# Patient Record
Sex: FEMALE | Race: WHITE | NOT HISPANIC OR LATINO | Employment: FULL TIME | URBAN - METROPOLITAN AREA
[De-identification: names, ages, dates, MRNs, and addresses within clinical notes are randomized per-mention and may not be internally consistent; named-entity substitution may affect disease eponyms.]

---

## 2017-04-20 ENCOUNTER — HOSPITAL ENCOUNTER (OUTPATIENT)
Dept: RADIOLOGY | Facility: MEDICAL CENTER | Age: 52
End: 2017-04-20
Attending: FAMILY MEDICINE
Payer: COMMERCIAL

## 2017-04-20 DIAGNOSIS — R92.8 ABNORMAL MAMMOGRAM: ICD-10-CM

## 2017-04-20 PROCEDURE — G0204 DX MAMMO INCL CAD BI: HCPCS

## 2017-05-22 ENCOUNTER — HOSPITAL ENCOUNTER (OUTPATIENT)
Dept: LAB | Facility: MEDICAL CENTER | Age: 52
End: 2017-05-22
Attending: FAMILY MEDICINE
Payer: COMMERCIAL

## 2017-05-22 DIAGNOSIS — Z00.00 WELL ADULT EXAM: ICD-10-CM

## 2017-05-22 LAB
25(OH)D3 SERPL-MCNC: 21 NG/ML (ref 30–100)
ALBUMIN SERPL BCP-MCNC: 4.5 G/DL (ref 3.2–4.9)
ALBUMIN/GLOB SERPL: 1.7 G/DL
ALP SERPL-CCNC: 66 U/L (ref 30–99)
ALT SERPL-CCNC: 17 U/L (ref 2–50)
ANION GAP SERPL CALC-SCNC: 7 MMOL/L (ref 0–11.9)
AST SERPL-CCNC: 21 U/L (ref 12–45)
BASOPHILS # BLD AUTO: 0.5 % (ref 0–1.8)
BASOPHILS # BLD: 0.03 K/UL (ref 0–0.12)
BILIRUB SERPL-MCNC: 0.6 MG/DL (ref 0.1–1.5)
BUN SERPL-MCNC: 16 MG/DL (ref 8–22)
CALCIUM SERPL-MCNC: 9.7 MG/DL (ref 8.5–10.5)
CHLORIDE SERPL-SCNC: 103 MMOL/L (ref 96–112)
CHOLEST SERPL-MCNC: 197 MG/DL (ref 100–199)
CO2 SERPL-SCNC: 28 MMOL/L (ref 20–33)
CREAT SERPL-MCNC: 0.88 MG/DL (ref 0.5–1.4)
EOSINOPHIL # BLD AUTO: 0.1 K/UL (ref 0–0.51)
EOSINOPHIL NFR BLD: 1.8 % (ref 0–6.9)
ERYTHROCYTE [DISTWIDTH] IN BLOOD BY AUTOMATED COUNT: 38 FL (ref 35.9–50)
GFR SERPL CREATININE-BSD FRML MDRD: >60 ML/MIN/1.73 M 2
GLOBULIN SER CALC-MCNC: 2.7 G/DL (ref 1.9–3.5)
GLUCOSE SERPL-MCNC: 84 MG/DL (ref 65–99)
HCT VFR BLD AUTO: 41.2 % (ref 37–47)
HDLC SERPL-MCNC: 59 MG/DL
HGB BLD-MCNC: 13.8 G/DL (ref 12–16)
IMM GRANULOCYTES # BLD AUTO: 0.02 K/UL (ref 0–0.11)
IMM GRANULOCYTES NFR BLD AUTO: 0.4 % (ref 0–0.9)
LDLC SERPL CALC-MCNC: 111 MG/DL
LYMPHOCYTES # BLD AUTO: 1.59 K/UL (ref 1–4.8)
LYMPHOCYTES NFR BLD: 27.9 % (ref 22–41)
MCH RBC QN AUTO: 29.2 PG (ref 27–33)
MCHC RBC AUTO-ENTMCNC: 33.5 G/DL (ref 33.6–35)
MCV RBC AUTO: 87.3 FL (ref 81.4–97.8)
MONOCYTES # BLD AUTO: 0.54 K/UL (ref 0–0.85)
MONOCYTES NFR BLD AUTO: 9.5 % (ref 0–13.4)
NEUTROPHILS # BLD AUTO: 3.41 K/UL (ref 2–7.15)
NEUTROPHILS NFR BLD: 59.9 % (ref 44–72)
NRBC # BLD AUTO: 0 K/UL
NRBC BLD AUTO-RTO: 0 /100 WBC
PLATELET # BLD AUTO: 257 K/UL (ref 164–446)
PMV BLD AUTO: 10.3 FL (ref 9–12.9)
POTASSIUM SERPL-SCNC: 3.8 MMOL/L (ref 3.6–5.5)
PROT SERPL-MCNC: 7.2 G/DL (ref 6–8.2)
RBC # BLD AUTO: 4.72 M/UL (ref 4.2–5.4)
SODIUM SERPL-SCNC: 138 MMOL/L (ref 135–145)
T4 FREE SERPL-MCNC: 0.88 NG/DL (ref 0.53–1.43)
TRIGL SERPL-MCNC: 134 MG/DL (ref 0–149)
TSH SERPL DL<=0.005 MIU/L-ACNC: 0.79 UIU/ML (ref 0.3–3.7)
WBC # BLD AUTO: 5.7 K/UL (ref 4.8–10.8)

## 2017-05-22 PROCEDURE — 84443 ASSAY THYROID STIM HORMONE: CPT

## 2017-05-22 PROCEDURE — 82306 VITAMIN D 25 HYDROXY: CPT

## 2017-05-22 PROCEDURE — 80053 COMPREHEN METABOLIC PANEL: CPT

## 2017-05-22 PROCEDURE — 85025 COMPLETE CBC W/AUTO DIFF WBC: CPT

## 2017-05-22 PROCEDURE — 84439 ASSAY OF FREE THYROXINE: CPT

## 2017-05-22 PROCEDURE — 80061 LIPID PANEL: CPT

## 2017-05-22 PROCEDURE — 36415 COLL VENOUS BLD VENIPUNCTURE: CPT

## 2018-03-01 ENCOUNTER — APPOINTMENT (RX ONLY)
Dept: URBAN - METROPOLITAN AREA CLINIC 31 | Facility: CLINIC | Age: 53
Setting detail: DERMATOLOGY
End: 2018-03-01

## 2018-03-01 DIAGNOSIS — L57.8 OTHER SKIN CHANGES DUE TO CHRONIC EXPOSURE TO NONIONIZING RADIATION: ICD-10-CM

## 2018-03-01 DIAGNOSIS — L57.0 ACTINIC KERATOSIS: ICD-10-CM

## 2018-03-01 DIAGNOSIS — D22 MELANOCYTIC NEVI: ICD-10-CM

## 2018-03-01 DIAGNOSIS — L82.1 OTHER SEBORRHEIC KERATOSIS: ICD-10-CM

## 2018-03-01 DIAGNOSIS — D18.0 HEMANGIOMA: ICD-10-CM

## 2018-03-01 PROBLEM — D22.5 MELANOCYTIC NEVI OF TRUNK: Status: ACTIVE | Noted: 2018-03-01

## 2018-03-01 PROBLEM — D22.61 MELANOCYTIC NEVI OF RIGHT UPPER LIMB, INCLUDING SHOULDER: Status: ACTIVE | Noted: 2018-03-01

## 2018-03-01 PROBLEM — D48.5 NEOPLASM OF UNCERTAIN BEHAVIOR OF SKIN: Status: ACTIVE | Noted: 2018-03-01

## 2018-03-01 PROBLEM — D22.39 MELANOCYTIC NEVI OF OTHER PARTS OF FACE: Status: ACTIVE | Noted: 2018-03-01

## 2018-03-01 PROBLEM — D22.62 MELANOCYTIC NEVI OF LEFT UPPER LIMB, INCLUDING SHOULDER: Status: ACTIVE | Noted: 2018-03-01

## 2018-03-01 PROBLEM — D18.01 HEMANGIOMA OF SKIN AND SUBCUTANEOUS TISSUE: Status: ACTIVE | Noted: 2018-03-01

## 2018-03-01 PROCEDURE — ? COUNSELING

## 2018-03-01 PROCEDURE — 99203 OFFICE O/P NEW LOW 30 MIN: CPT | Mod: 25

## 2018-03-01 PROCEDURE — ? LIQUID NITROGEN

## 2018-03-01 PROCEDURE — 17000 DESTRUCT PREMALG LESION: CPT

## 2018-03-01 PROCEDURE — 11100: CPT | Mod: 59

## 2018-03-01 PROCEDURE — ? BIOPSY BY SHAVE METHOD

## 2018-03-01 ASSESSMENT — LOCATION SIMPLE DESCRIPTION DERM
LOCATION SIMPLE: LEFT HAND
LOCATION SIMPLE: LEFT UPPER BACK
LOCATION SIMPLE: RIGHT UPPER BACK
LOCATION SIMPLE: LEFT LOWER BACK
LOCATION SIMPLE: LEFT CHEEK
LOCATION SIMPLE: CHEST
LOCATION SIMPLE: RIGHT FOREARM
LOCATION SIMPLE: RIGHT UPPER ARM
LOCATION SIMPLE: NOSE
LOCATION SIMPLE: LEFT ANTERIOR NECK
LOCATION SIMPLE: LEFT FOREARM
LOCATION SIMPLE: RIGHT HAND

## 2018-03-01 ASSESSMENT — LOCATION DETAILED DESCRIPTION DERM
LOCATION DETAILED: LEFT CENTRAL MALAR CHEEK
LOCATION DETAILED: RIGHT ANTERIOR DISTAL UPPER ARM
LOCATION DETAILED: LEFT SUPERIOR ANTERIOR NECK
LOCATION DETAILED: RIGHT MEDIAL SUPERIOR CHEST
LOCATION DETAILED: LEFT ULNAR DORSAL HAND
LOCATION DETAILED: RIGHT MEDIAL INFERIOR CHEST
LOCATION DETAILED: LEFT MEDIAL INFERIOR CHEST
LOCATION DETAILED: RIGHT VENTRAL PROXIMAL FOREARM
LOCATION DETAILED: LEFT INFERIOR LATERAL MIDBACK
LOCATION DETAILED: RIGHT DORSAL MIDDLE METACARPOPHALANGEAL JOINT
LOCATION DETAILED: LEFT SUPERIOR UPPER BACK
LOCATION DETAILED: LEFT VENTRAL PROXIMAL FOREARM
LOCATION DETAILED: NASAL TIP
LOCATION DETAILED: RIGHT INFERIOR MEDIAL UPPER BACK
LOCATION DETAILED: RIGHT MEDIAL UPPER BACK

## 2018-03-01 ASSESSMENT — LOCATION ZONE DERM
LOCATION ZONE: ARM
LOCATION ZONE: NECK
LOCATION ZONE: TRUNK
LOCATION ZONE: FACE
LOCATION ZONE: NOSE
LOCATION ZONE: HAND

## 2018-03-01 NOTE — PROCEDURE: LIQUID NITROGEN
Detail Level: Detailed
Consent: The patient's consent was obtained including but not limited to risks of crusting, scabbing, blistering, scarring, darker or lighter pigmentary change, recurrence, incomplete removal and infection.
Post-Care Instructions: I reviewed with the patient in detail post-care instructions. Patient is to wear sunprotection, and avoid picking at any of the treated lesions. Pt may apply Vaseline to crusted or scabbing areas.
Duration Of Freeze Thaw-Cycle (Seconds): 15
Number Of Freeze-Thaw Cycles: 1 freeze-thaw cycle
Render Post-Care Instructions In Note?: no

## 2018-03-01 NOTE — PROCEDURE: BIOPSY BY SHAVE METHOD
Render Post-Care Instructions In Note?: no
Notification Instructions: Patient will be notified of biopsy results. However, patient instructed to call the office if not contacted within 2 weeks.
Anesthesia Volume In Cc: 0
Electrodesiccation Text: The wound bed was treated with electrodesiccation after the biopsy was performed.
Type Of Destruction Used: Curettage
Size Of Lesion In Cm: 0.7
Anesthesia Type: 1% lidocaine with epinephrine
Lab Facility: 
Post-Care Instructions: I reviewed with the patient in detail post-care instructions. Patient is to keep the biopsy site bandaged overnight, and then wash once daily with soap and water and then apply a thin layer of petrolatum once daily until healed.
Detail Level: Detailed
Consent: Written consent was obtained and risks were reviewed including but not limited to scarring, infection, bleeding, scabbing, incomplete removal, nerve damage and allergy to anesthesia.
Cryotherapy Text: The wound bed was treated with cryotherapy after the biopsy was performed.
Hemostasis: Aluminum Chloride and Electrocautery
Dressing: bandage
Electrodesiccation And Curettage Text: The wound bed was treated with electrodesiccation and curettage after the biopsy was performed.
Lab: [id number]
Biopsy Type: H and E
Silver Nitrate Text: The wound bed was treated with silver nitrate after the biopsy was performed.
Wound Care: Petrolatum
Billing Type: Third-Party Bill
Curettage Text: The wound bed was treated with curettage after the biopsy was performed.

## 2018-05-09 PROBLEM — R00.2 HEART PALPITATIONS: Status: ACTIVE | Noted: 2018-05-09

## 2018-05-09 PROBLEM — K21.9 GASTROESOPHAGEAL REFLUX DISEASE: Status: ACTIVE | Noted: 2018-05-09

## 2018-06-23 ENCOUNTER — HOSPITAL ENCOUNTER (OUTPATIENT)
Dept: RADIOLOGY | Facility: MEDICAL CENTER | Age: 53
End: 2018-06-23
Attending: FAMILY MEDICINE
Payer: COMMERCIAL

## 2018-06-23 DIAGNOSIS — Z00.00 WELL ADULT EXAM: ICD-10-CM

## 2018-06-23 PROCEDURE — 77067 SCR MAMMO BI INCL CAD: CPT

## 2018-07-13 ENCOUNTER — HOSPITAL ENCOUNTER (OUTPATIENT)
Dept: HOSPITAL 8 - LAB | Age: 53
Discharge: HOME | End: 2018-07-13
Attending: FAMILY MEDICINE
Payer: COMMERCIAL

## 2018-07-13 DIAGNOSIS — Z00.00: Primary | ICD-10-CM

## 2018-07-13 LAB
ALBUMIN SERPL-MCNC: 3.8 G/DL (ref 3.4–5)
ALP SERPL-CCNC: 77 U/L (ref 45–117)
ALT SERPL-CCNC: 70 U/L (ref 12–78)
ANION GAP SERPL CALC-SCNC: 8 MMOL/L (ref 5–15)
BASOPHILS # BLD AUTO: 0.03 X10^3/UL (ref 0–0.1)
BASOPHILS NFR BLD AUTO: 1 % (ref 0–1)
BILIRUB SERPL-MCNC: 0.5 MG/DL (ref 0.2–1)
CALCIUM SERPL-MCNC: 8.8 MG/DL (ref 8.5–10.1)
CHLORIDE SERPL-SCNC: 108 MMOL/L (ref 98–107)
CHOL/HDL RATIO: 4.2
CREAT SERPL-MCNC: 1.17 MG/DL (ref 0.55–1.02)
EOSINOPHIL # BLD AUTO: 0.14 X10^3/UL (ref 0–0.4)
EOSINOPHIL NFR BLD AUTO: 3 % (ref 1–7)
ERYTHROCYTE [DISTWIDTH] IN BLOOD BY AUTOMATED COUNT: 12.6 % (ref 9.6–15.2)
HDL CHOL %: 24 % (ref 28–40)
HDL CHOLESTEROL (DIRECT): 49 MG/DL (ref 40–60)
LDL CHOLESTEROL,CALCULATED: 131 MG/DL (ref 54–169)
LDLC/HDLC SERPL: 2.7 {RATIO} (ref 0.5–3)
LYMPHOCYTES # BLD AUTO: 1.73 X10^3/UL (ref 1–3.4)
LYMPHOCYTES NFR BLD AUTO: 34 % (ref 22–44)
MCH RBC QN AUTO: 30 PG (ref 27–34.8)
MCHC RBC AUTO-ENTMCNC: 34.3 G/DL (ref 32.4–35.8)
MCV RBC AUTO: 87.2 FL (ref 80–100)
MD: NO
MONOCYTES # BLD AUTO: 0.52 X10^3/UL (ref 0.2–0.8)
MONOCYTES NFR BLD AUTO: 10 % (ref 2–9)
NEUTROPHILS # BLD AUTO: 2.7 X10^3/UL (ref 1.8–6.8)
NEUTROPHILS NFR BLD AUTO: 53 % (ref 42–75)
PLATELET # BLD AUTO: 257 X10^3/UL (ref 130–400)
PMV BLD AUTO: 8.5 FL (ref 7.4–10.4)
PROT SERPL-MCNC: 7.3 G/DL (ref 6.4–8.2)
RBC # BLD AUTO: 4.79 X10^6/UL (ref 3.82–5.3)
T4 FREE SERPL-MCNC: 0.98 NG/DL (ref 0.76–1.46)
TRIGL SERPL-MCNC: 125 MG/DL (ref 50–200)
TSH SERPL-ACNC: 0.68 MIU/L (ref 0.36–3.74)
VLDLC SERPL CALC-MCNC: 25 MG/DL (ref 0–25)

## 2018-07-13 PROCEDURE — 84481 FREE ASSAY (FT-3): CPT

## 2018-07-13 PROCEDURE — 80061 LIPID PANEL: CPT

## 2018-07-13 PROCEDURE — 36415 COLL VENOUS BLD VENIPUNCTURE: CPT

## 2018-07-13 PROCEDURE — 84439 ASSAY OF FREE THYROXINE: CPT

## 2018-07-13 PROCEDURE — 86677 HELICOBACTER PYLORI ANTIBODY: CPT

## 2018-07-13 PROCEDURE — 80053 COMPREHEN METABOLIC PANEL: CPT

## 2018-07-13 PROCEDURE — 84443 ASSAY THYROID STIM HORMONE: CPT

## 2018-07-13 PROCEDURE — 85025 COMPLETE CBC W/AUTO DIFF WBC: CPT

## 2019-04-02 ENCOUNTER — APPOINTMENT (RX ONLY)
Dept: URBAN - METROPOLITAN AREA CLINIC 31 | Facility: CLINIC | Age: 54
Setting detail: DERMATOLOGY
End: 2019-04-02

## 2019-04-02 DIAGNOSIS — D18.0 HEMANGIOMA: ICD-10-CM

## 2019-04-02 DIAGNOSIS — D22 MELANOCYTIC NEVI: ICD-10-CM

## 2019-04-02 DIAGNOSIS — L57.0 ACTINIC KERATOSIS: ICD-10-CM

## 2019-04-02 DIAGNOSIS — L57.8 OTHER SKIN CHANGES DUE TO CHRONIC EXPOSURE TO NONIONIZING RADIATION: ICD-10-CM

## 2019-04-02 DIAGNOSIS — L82.1 OTHER SEBORRHEIC KERATOSIS: ICD-10-CM

## 2019-04-02 PROBLEM — D22.5 MELANOCYTIC NEVI OF TRUNK: Status: ACTIVE | Noted: 2019-04-02

## 2019-04-02 PROBLEM — D18.01 HEMANGIOMA OF SKIN AND SUBCUTANEOUS TISSUE: Status: ACTIVE | Noted: 2019-04-02

## 2019-04-02 PROBLEM — D22.4 MELANOCYTIC NEVI OF SCALP AND NECK: Status: ACTIVE | Noted: 2019-04-02

## 2019-04-02 PROCEDURE — ? COUNSELING

## 2019-04-02 PROCEDURE — ? LIQUID NITROGEN

## 2019-04-02 PROCEDURE — 99214 OFFICE O/P EST MOD 30 MIN: CPT | Mod: 25

## 2019-04-02 PROCEDURE — 17000 DESTRUCT PREMALG LESION: CPT

## 2019-04-02 ASSESSMENT — LOCATION DETAILED DESCRIPTION DERM
LOCATION DETAILED: SUBXIPHOID
LOCATION DETAILED: RIGHT VENTRAL PROXIMAL FOREARM
LOCATION DETAILED: RIGHT LATERAL SUPERIOR CHEST
LOCATION DETAILED: LEFT MID-UPPER BACK
LOCATION DETAILED: LEFT RIB CAGE
LOCATION DETAILED: LEFT ANTERIOR SHOULDER
LOCATION DETAILED: LEFT INFERIOR UPPER BACK
LOCATION DETAILED: LEFT CENTRAL MALAR CHEEK
LOCATION DETAILED: MID POSTERIOR NECK
LOCATION DETAILED: LEFT SUPERIOR MEDIAL MIDBACK
LOCATION DETAILED: LEFT VENTRAL PROXIMAL FOREARM
LOCATION DETAILED: LEFT ULNAR DORSAL HAND
LOCATION DETAILED: RIGHT SUPERIOR MEDIAL UPPER BACK
LOCATION DETAILED: RIGHT POSTERIOR SHOULDER
LOCATION DETAILED: LEFT PROXIMAL DORSAL FOREARM
LOCATION DETAILED: RIGHT MEDIAL INFERIOR CHEST
LOCATION DETAILED: LEFT INFERIOR MEDIAL UPPER BACK
LOCATION DETAILED: RIGHT ANTERIOR DISTAL UPPER ARM
LOCATION DETAILED: RIGHT PROXIMAL DORSAL FOREARM
LOCATION DETAILED: LEFT MEDIAL BREAST 7-8:00 REGION
LOCATION DETAILED: PERIUMBILICAL SKIN
LOCATION DETAILED: RIGHT INFERIOR MEDIAL UPPER BACK
LOCATION DETAILED: SUPERIOR THORACIC SPINE
LOCATION DETAILED: RIGHT RIB CAGE
LOCATION DETAILED: LEFT SUPERIOR CENTRAL MALAR CHEEK
LOCATION DETAILED: LEFT MEDIAL BREAST 9-10:00 REGION
LOCATION DETAILED: LEFT SUPERIOR ANTERIOR NECK
LOCATION DETAILED: RIGHT DORSAL MIDDLE METACARPOPHALANGEAL JOINT
LOCATION DETAILED: RIGHT ANTERIOR SHOULDER
LOCATION DETAILED: LEFT SUPERIOR LATERAL UPPER BACK

## 2019-04-02 ASSESSMENT — LOCATION SIMPLE DESCRIPTION DERM
LOCATION SIMPLE: LEFT HAND
LOCATION SIMPLE: RIGHT HAND
LOCATION SIMPLE: LEFT ANTERIOR NECK
LOCATION SIMPLE: LEFT FOREARM
LOCATION SIMPLE: RIGHT FOREARM
LOCATION SIMPLE: LEFT SHOULDER
LOCATION SIMPLE: POSTERIOR NECK
LOCATION SIMPLE: LEFT CHEEK
LOCATION SIMPLE: RIGHT UPPER BACK
LOCATION SIMPLE: ABDOMEN
LOCATION SIMPLE: LEFT UPPER BACK
LOCATION SIMPLE: UPPER BACK
LOCATION SIMPLE: RIGHT SHOULDER
LOCATION SIMPLE: RIGHT UPPER ARM
LOCATION SIMPLE: CHEST
LOCATION SIMPLE: LEFT BREAST
LOCATION SIMPLE: LEFT LOWER BACK

## 2019-04-02 ASSESSMENT — LOCATION ZONE DERM
LOCATION ZONE: FACE
LOCATION ZONE: TRUNK
LOCATION ZONE: ARM
LOCATION ZONE: NECK
LOCATION ZONE: HAND

## 2019-09-05 ENCOUNTER — HOSPITAL ENCOUNTER (OUTPATIENT)
Dept: LAB | Facility: MEDICAL CENTER | Age: 54
End: 2019-09-05
Attending: FAMILY MEDICINE
Payer: COMMERCIAL

## 2019-09-05 LAB
ALBUMIN SERPL BCP-MCNC: 4.2 G/DL (ref 3.2–4.9)
ALBUMIN/GLOB SERPL: 1.5 G/DL
ALP SERPL-CCNC: 56 U/L (ref 30–99)
ALT SERPL-CCNC: 19 U/L (ref 2–50)
ANION GAP SERPL CALC-SCNC: 9 MMOL/L (ref 0–11.9)
AST SERPL-CCNC: 23 U/L (ref 12–45)
BASOPHILS # BLD AUTO: 1.3 % (ref 0–1.8)
BASOPHILS # BLD: 0.06 K/UL (ref 0–0.12)
BILIRUB SERPL-MCNC: 0.6 MG/DL (ref 0.1–1.5)
BUN SERPL-MCNC: 24 MG/DL (ref 8–22)
CALCIUM SERPL-MCNC: 9.2 MG/DL (ref 8.5–10.5)
CHLORIDE SERPL-SCNC: 104 MMOL/L (ref 96–112)
CO2 SERPL-SCNC: 27 MMOL/L (ref 20–33)
CREAT SERPL-MCNC: 1.14 MG/DL (ref 0.5–1.4)
EOSINOPHIL # BLD AUTO: 0.12 K/UL (ref 0–0.51)
EOSINOPHIL NFR BLD: 2.6 % (ref 0–6.9)
ERYTHROCYTE [DISTWIDTH] IN BLOOD BY AUTOMATED COUNT: 39.8 FL (ref 35.9–50)
GLOBULIN SER CALC-MCNC: 2.8 G/DL (ref 1.9–3.5)
GLUCOSE SERPL-MCNC: 92 MG/DL (ref 65–99)
HCT VFR BLD AUTO: 42.5 % (ref 37–47)
HGB BLD-MCNC: 13.5 G/DL (ref 12–16)
IMM GRANULOCYTES # BLD AUTO: 0.01 K/UL (ref 0–0.11)
IMM GRANULOCYTES NFR BLD AUTO: 0.2 % (ref 0–0.9)
LYMPHOCYTES # BLD AUTO: 1.74 K/UL (ref 1–4.8)
LYMPHOCYTES NFR BLD: 37.9 % (ref 22–41)
MCH RBC QN AUTO: 28.8 PG (ref 27–33)
MCHC RBC AUTO-ENTMCNC: 31.8 G/DL (ref 33.6–35)
MCV RBC AUTO: 90.6 FL (ref 81.4–97.8)
MONOCYTES # BLD AUTO: 0.45 K/UL (ref 0–0.85)
MONOCYTES NFR BLD AUTO: 9.8 % (ref 0–13.4)
NEUTROPHILS # BLD AUTO: 2.21 K/UL (ref 2–7.15)
NEUTROPHILS NFR BLD: 48.2 % (ref 44–72)
NRBC # BLD AUTO: 0 K/UL
NRBC BLD-RTO: 0 /100 WBC
PLATELET # BLD AUTO: 228 K/UL (ref 164–446)
PMV BLD AUTO: 10.3 FL (ref 9–12.9)
POTASSIUM SERPL-SCNC: 3.9 MMOL/L (ref 3.6–5.5)
PROT SERPL-MCNC: 7 G/DL (ref 6–8.2)
RBC # BLD AUTO: 4.69 M/UL (ref 4.2–5.4)
SODIUM SERPL-SCNC: 140 MMOL/L (ref 135–145)
T4 FREE SERPL-MCNC: 0.7 NG/DL (ref 0.53–1.43)
TSH SERPL DL<=0.005 MIU/L-ACNC: 1.51 UIU/ML (ref 0.38–5.33)
WBC # BLD AUTO: 4.6 K/UL (ref 4.8–10.8)

## 2019-09-05 PROCEDURE — 84443 ASSAY THYROID STIM HORMONE: CPT

## 2019-09-05 PROCEDURE — 85025 COMPLETE CBC W/AUTO DIFF WBC: CPT

## 2019-09-05 PROCEDURE — 84439 ASSAY OF FREE THYROXINE: CPT

## 2019-09-05 PROCEDURE — 80053 COMPREHEN METABOLIC PANEL: CPT

## 2019-09-05 PROCEDURE — 36415 COLL VENOUS BLD VENIPUNCTURE: CPT

## 2019-09-10 ENCOUNTER — HOSPITAL ENCOUNTER (OUTPATIENT)
Dept: LAB | Facility: MEDICAL CENTER | Age: 54
End: 2019-09-10
Payer: COMMERCIAL

## 2019-09-10 LAB
CHOLEST SERPL-MCNC: 192 MG/DL (ref 100–199)
FASTING STATUS PATIENT QL REPORTED: NORMAL
GLUCOSE SERPL-MCNC: 91 MG/DL (ref 65–99)
HDLC SERPL-MCNC: 60 MG/DL
LDLC SERPL CALC-MCNC: 119 MG/DL
TRIGL SERPL-MCNC: 66 MG/DL (ref 0–149)

## 2019-10-15 ENCOUNTER — APPOINTMENT (OUTPATIENT)
Dept: RADIOLOGY | Facility: MEDICAL CENTER | Age: 54
End: 2019-10-15
Attending: OBSTETRICS & GYNECOLOGY
Payer: COMMERCIAL

## 2019-10-31 ENCOUNTER — OFFICE VISIT (OUTPATIENT)
Dept: URGENT CARE | Facility: CLINIC | Age: 54
End: 2019-10-31
Payer: COMMERCIAL

## 2019-10-31 VITALS
SYSTOLIC BLOOD PRESSURE: 118 MMHG | BODY MASS INDEX: 26.46 KG/M2 | RESPIRATION RATE: 14 BRPM | OXYGEN SATURATION: 98 % | HEIGHT: 64 IN | HEART RATE: 85 BPM | DIASTOLIC BLOOD PRESSURE: 78 MMHG | TEMPERATURE: 97.4 F | WEIGHT: 155 LBS

## 2019-10-31 DIAGNOSIS — J11.1 INFLUENZA-LIKE ILLNESS: ICD-10-CM

## 2019-10-31 LAB
FLUAV+FLUBV AG SPEC QL IA: NEGATIVE
INT CON NEG: NORMAL
INT CON POS: NORMAL

## 2019-10-31 PROCEDURE — 87804 INFLUENZA ASSAY W/OPTIC: CPT | Performed by: PHYSICIAN ASSISTANT

## 2019-10-31 PROCEDURE — 99203 OFFICE O/P NEW LOW 30 MIN: CPT | Performed by: PHYSICIAN ASSISTANT

## 2019-10-31 RX ORDER — OSELTAMIVIR PHOSPHATE 75 MG/1
75 CAPSULE ORAL 2 TIMES DAILY
Qty: 10 CAP | Refills: 0 | Status: SHIPPED | OUTPATIENT
Start: 2019-10-31 | End: 2020-04-28

## 2019-10-31 RX ORDER — ALBUTEROL SULFATE 90 UG/1
2 AEROSOL, METERED RESPIRATORY (INHALATION) EVERY 6 HOURS PRN
Qty: 8.5 G | Refills: 0 | Status: SHIPPED | OUTPATIENT
Start: 2019-10-31 | End: 2020-11-12

## 2019-10-31 ASSESSMENT — ENCOUNTER SYMPTOMS
SORE THROAT: 1
EYE PAIN: 0
COUGH: 1
VOMITING: 0
CHILLS: 1
HEADACHES: 1
FEVER: 1
WHEEZING: 0
NAUSEA: 0
SPUTUM PRODUCTION: 1
DIZZINESS: 0
BLURRED VISION: 0
PALPITATIONS: 0
SINUS PAIN: 1
MYALGIAS: 1

## 2019-10-31 NOTE — PROGRESS NOTES
Subjective:      Rik Portillo is a 54 y.o. female who presents with Nasal Congestion; Cough; and Generalized Body Aches      HPI:  This is a new problem. Rik Portillo is a 54 y.o. female who presents today with flulike symptoms.  Patient reports that approximately 4 days ago she started to have a little bit of nasal congestion.  She thought it was just allergy so she started taking allergy medication.  She said she was generally feeling fine until last night.  She reports last night she started to get body aches, chills, and fever.  This morning when she checked her temperature it was 101.4 °F.  She also reports cough intermittently productive of sputum.  She has mild sore throat and headache with sinus pain/pressure.  She took some ibuprofen earlier today which provided mild relief of symptoms.  She denies chest pain, shortness of breath, nausea/vomiting, or lightheadedness/dizziness.      Review of Systems   Constitutional: Positive for chills, fever and malaise/fatigue.   HENT: Positive for congestion, sinus pain and sore throat. Negative for ear pain.    Eyes: Negative for blurred vision and pain.   Respiratory: Positive for cough and sputum production. Negative for wheezing.    Cardiovascular: Negative for chest pain and palpitations.   Gastrointestinal: Negative for nausea and vomiting.   Musculoskeletal: Positive for myalgias.   Skin: Negative for rash.   Neurological: Positive for headaches. Negative for dizziness.       PMH:  has a past medical history of Abnormal mammogram. She also has no past medical history of Arthritis, Asthma, Hypertension, or Kidney disease.  MEDS:   Current Outpatient Medications:   •  estradiol (MINIVELLE) 0.1 MG/24HR PATCH BIWEEKLY, Apply one patch to skin as directed 2 times a week, Disp: 8 Patch, Rfl: 5  •  aspirin 81 MG tablet, Take 81 mg by mouth every day., Disp: , Rfl:   •  omeprazole (PRILOSEC) 20 MG delayed-release capsule, Take 1 Cap by mouth 2 times a day., Disp: 60 Cap,  "Rfl: 5  •  cyclobenzaprine (FLEXERIL) 10 MG Tab, Take 0.5 Tabs by mouth every 8 hours as needed (Headaches). For headaches, Disp: 30 Tab, Rfl: 5  •  ondansetron (ZOFRAN ODT) 4 MG TABLET DISPERSIBLE, Take 1 Tab by mouth as needed. For travel, Disp: 10 Tab, Rfl: 0  ALLERGIES:   Allergies   Allergen Reactions   • Phenergan [Benzyl Alc-Promethazine]      Nerve Reaction   • Reglan [Kdc:Yellow Dye+Ci Pigment Blue 63+Metoclopramide]      Anxiety     SURGHX:   Past Surgical History:   Procedure Laterality Date   • TONSILLECTOMY AND ADENOIDECTOMY  1998   • ABDOMINAL HYSTERECTOMY TOTAL     • APPENDECTOMY     • PRIMARY C SECTION     • TUBAL COAGULATION LAPAROSCOPIC BILATERAL       SOCHX:  reports that she has never smoked. She has never used smokeless tobacco. She reports that she drinks alcohol. She reports that she does not use drugs.  FH: Family history was reviewed, no pertinent findings to report     Objective:     /78 (BP Location: Right arm, Patient Position: Sitting)   Pulse 85   Temp 36.3 °C (97.4 °F)   Resp 14   Ht 1.626 m (5' 4\")   Wt 70.3 kg (155 lb)   SpO2 98%   BMI 26.61 kg/m²      Physical Exam   Constitutional: She is oriented to person, place, and time. She appears well-developed and well-nourished.   HENT:   Head: Normocephalic and atraumatic.   Right Ear: Tympanic membrane, external ear and ear canal normal.   Left Ear: Tympanic membrane, external ear and ear canal normal.   Nose: Mucosal edema and rhinorrhea present. Right sinus exhibits maxillary sinus tenderness and frontal sinus tenderness. Left sinus exhibits maxillary sinus tenderness and frontal sinus tenderness.   Mouth/Throat: Uvula is midline, oropharynx is clear and moist and mucous membranes are normal.   Eyes: Pupils are equal, round, and reactive to light. Conjunctivae are normal.   Neck: Normal range of motion.   Cardiovascular: Normal rate, regular rhythm and normal heart sounds.   No murmur heard.  Pulmonary/Chest: Effort normal " and breath sounds normal. She has no wheezes.   Lymphadenopathy:     She has no cervical adenopathy.   Neurological: She is alert and oriented to person, place, and time.   Skin: Skin is warm and dry. Capillary refill takes less than 2 seconds.   Psychiatric: She has a normal mood and affect. Her behavior is normal. Judgment normal.   Vitals reviewed.      POCT Influenza A/B - Negative     Assessment/Plan:     1. Influenza-like illness  - POCT Influenza A/B  - oseltamivir (TAMIFLU) 75 MG Cap; Take 1 Cap by mouth 2 times a day.  Dispense: 10 Cap; Refill: 0  - albuterol 108 (90 Base) MCG/ACT Aero Soln inhalation aerosol; Inhale 2 Puffs by mouth every 6 hours as needed for Shortness of Breath.  Dispense: 8.5 g; Refill: 0  - PO fluids  - Rest  - Tylenol or ibuprofen as needed for fever > 100.4 F  - Note given for work      Differential Diagnosis, natural history, and supportive care discussed. Return to the Urgent Care or follow up with your PCP if symptoms fail to resolve, or for any new or worsening symptoms. Emergency room precautions discussed. Patient and/or family appears understanding of information.

## 2019-10-31 NOTE — LETTER
October 31, 2019         Patient: Rik Portillo   YOB: 1965   Date of Visit: 10/31/2019           To Whom it May Concern:    Rik Portillo was seen in my clinic on 10/31/2019. Please excuse her absence from work for 10/31/2019 and 11/1/2019.    If you have any questions or concerns, please don't hesitate to call.        Sincerely,           Joselin Hogue P.A.-C.  Electronically Signed

## 2019-11-05 ENCOUNTER — HOSPITAL ENCOUNTER (OUTPATIENT)
Dept: RADIOLOGY | Facility: MEDICAL CENTER | Age: 54
End: 2019-11-05
Attending: OBSTETRICS & GYNECOLOGY
Payer: COMMERCIAL

## 2019-11-05 DIAGNOSIS — Z01.419 ROUTINE GYNECOLOGICAL EXAMINATION: ICD-10-CM

## 2019-11-05 PROCEDURE — 77063 BREAST TOMOSYNTHESIS BI: CPT

## 2020-04-28 ENCOUNTER — APPOINTMENT (OUTPATIENT)
Dept: RADIOLOGY | Facility: IMAGING CENTER | Age: 55
End: 2020-04-28
Attending: NURSE PRACTITIONER
Payer: COMMERCIAL

## 2020-04-28 ENCOUNTER — OFFICE VISIT (OUTPATIENT)
Dept: URGENT CARE | Facility: CLINIC | Age: 55
End: 2020-04-28
Payer: COMMERCIAL

## 2020-04-28 VITALS
HEIGHT: 64 IN | RESPIRATION RATE: 16 BRPM | BODY MASS INDEX: 27.39 KG/M2 | DIASTOLIC BLOOD PRESSURE: 76 MMHG | OXYGEN SATURATION: 95 % | TEMPERATURE: 97.6 F | HEART RATE: 93 BPM | SYSTOLIC BLOOD PRESSURE: 112 MMHG | WEIGHT: 160.4 LBS

## 2020-04-28 DIAGNOSIS — S96.911A RIGHT FOOT STRAIN, INITIAL ENCOUNTER: ICD-10-CM

## 2020-04-28 DIAGNOSIS — M19.071 OSTEOARTHRITIS OF RIGHT FOOT, UNSPECIFIED OSTEOARTHRITIS TYPE: ICD-10-CM

## 2020-04-28 DIAGNOSIS — M20.11 HALLUX VALGUS OF RIGHT FOOT: ICD-10-CM

## 2020-04-28 DIAGNOSIS — M79.671 RIGHT FOOT PAIN: ICD-10-CM

## 2020-04-28 PROCEDURE — 73630 X-RAY EXAM OF FOOT: CPT | Mod: TC,RT | Performed by: NURSE PRACTITIONER

## 2020-04-28 PROCEDURE — 99203 OFFICE O/P NEW LOW 30 MIN: CPT | Performed by: NURSE PRACTITIONER

## 2020-04-28 RX ORDER — IBUPROFEN 600 MG/1
600 TABLET ORAL EVERY 6 HOURS PRN
Qty: 30 TAB | Refills: 0 | Status: SHIPPED | OUTPATIENT
Start: 2020-04-28 | End: 2020-11-12

## 2020-04-28 ASSESSMENT — FIBROSIS 4 INDEX: FIB4 SCORE: 1.25

## 2020-04-28 ASSESSMENT — ENCOUNTER SYMPTOMS
SENSORY CHANGE: 0
JOINT SWELLING: 0
FEVER: 0
WEAKNESS: 0

## 2020-04-28 NOTE — PROGRESS NOTES
"  Subjective:     Rik Portillo is a 55 y.o. female who presents for Foot Pain ((R) foot;  x 4 days; running injury;lateral side )      Was running on Saturday, when she noted she may have done something to hurt her right foot. Was running on sand, \"thought ouch\", and completed her run. Was sore the next day. Now has pain with standing. Starts as an ache to the lateral aspect and becomes more intense. No pain while sitting. No bruising, redness, swelling. Iced and took Tylenol. History of a horse stepping on foot 6 years ago. Can tolerate ibuprofen.     Foot Problem   This is a new problem. The current episode started in the past 7 days. The problem occurs daily. The problem has been gradually worsening. Pertinent negatives include no fever, joint swelling, rash or weakness. The symptoms are aggravated by standing and walking. She has tried ice, rest and acetaminophen for the symptoms. The treatment provided mild relief.       Past Medical History:   Diagnosis Date   • Abnormal mammogram        Past Surgical History:   Procedure Laterality Date   • TONSILLECTOMY AND ADENOIDECTOMY  1998   • ABDOMINAL HYSTERECTOMY TOTAL     • APPENDECTOMY     • PRIMARY C SECTION     • TUBAL COAGULATION LAPAROSCOPIC BILATERAL         Social History     Socioeconomic History   • Marital status:      Spouse name: Not on file   • Number of children: Not on file   • Years of education: Not on file   • Highest education level: Not on file   Occupational History   • Not on file   Social Needs   • Financial resource strain: Not on file   • Food insecurity     Worry: Not on file     Inability: Not on file   • Transportation needs     Medical: Not on file     Non-medical: Not on file   Tobacco Use   • Smoking status: Never Smoker   • Smokeless tobacco: Never Used   Substance and Sexual Activity   • Alcohol use: Not Currently     Alcohol/week: 0.0 oz     Comment: Rare   • Drug use: Never   • Sexual activity: Not on file   Lifestyle   • " "Physical activity     Days per week: Not on file     Minutes per session: Not on file   • Stress: Not on file   Relationships   • Social connections     Talks on phone: Not on file     Gets together: Not on file     Attends Rastafarian service: Not on file     Active member of club or organization: Not on file     Attends meetings of clubs or organizations: Not on file     Relationship status: Not on file   • Intimate partner violence     Fear of current or ex partner: Not on file     Emotionally abused: Not on file     Physically abused: Not on file     Forced sexual activity: Not on file   Other Topics Concern   • Not on file   Social History Narrative   • Not on file        Family History   Problem Relation Age of Onset   • Lung Disease Mother    • Heart Disease Father    • Cancer Maternal Grandmother         Bladder   • Diabetes Brother         Allergies   Allergen Reactions   • Phenergan [Benzyl Alc-Promethazine]      Nerve Reaction   • Reglan [Kdc:Yellow Dye+Ci Pigment Blue 63+Metoclopramide]      Anxiety       Review of Systems   Constitutional: Negative for fever.   Musculoskeletal: Negative for joint swelling.        Acute right foot pain. Known hx of bunion.    Skin: Negative for rash.   Neurological: Negative for sensory change and weakness.   All other systems reviewed and are negative.       Objective:   /76 (BP Location: Right arm, Patient Position: Sitting)   Pulse 93   Temp 36.4 °C (97.6 °F) (Temporal)   Resp 16   Ht 1.626 m (5' 4\")   Wt 72.8 kg (160 lb 6.4 oz)   SpO2 95%   BMI 27.53 kg/m²     Physical Exam  Vitals signs reviewed.   Constitutional:       General: She is not in acute distress.     Appearance: She is well-developed.   HENT:      Head: Normocephalic and atraumatic.      Right Ear: External ear normal.      Left Ear: External ear normal.      Nose: Nose normal.   Eyes:      Conjunctiva/sclera: Conjunctivae normal.   Neck:      Musculoskeletal: Normal range of motion.   "   Cardiovascular:      Rate and Rhythm: Normal rate.      Pulses:           Dorsalis pedis pulses are 2+ on the right side.   Pulmonary:      Effort: Pulmonary effort is normal.   Musculoskeletal: Normal range of motion.         General: Tenderness present. No swelling.      Right ankle: Normal.      Right foot: Normal range of motion and normal capillary refill. Tenderness and bony tenderness present. No swelling, crepitus or deformity.        Feet:       Comments: No tenderness to palpation over heel, achilles, or arch.    Feet:      Right foot:      Skin integrity: No ulcer, blister, skin breakdown, erythema or warmth.   Skin:     General: Skin is warm and dry.      Findings: No rash.   Neurological:      General: No focal deficit present.      Mental Status: She is alert and oriented to person, place, and time.      GCS: GCS eye subscore is 4. GCS verbal subscore is 5. GCS motor subscore is 6.   Psychiatric:         Mood and Affect: Mood normal.         Speech: Speech normal.         Behavior: Behavior normal.         Thought Content: Thought content normal.         Judgment: Judgment normal.         Assessment/Plan:   1. Right foot strain, initial encounter  - DX-FOOT-COMPLETE 3+ RIGHT; Future  - ibuprofen (MOTRIN) 600 MG Tab; Take 1 Tab by mouth every 6 hours as needed for Moderate Pain, Fever or Inflammation.  Dispense: 30 Tab; Refill: 0    2. Hallux valgus of right foot  - DX-FOOT-COMPLETE 3+ RIGHT; Hallux valgus with first metatarsal phalangeal joint osteoarthritis.  - REFERRAL TO PODIATRY  - ibuprofen (MOTRIN) 600 MG Tab; Take 1 Tab by mouth every 6 hours as needed for Moderate Pain, Fever or Inflammation.  Dispense: 30 Tab; Refill: 0    3. Osteoarthritis of right foot, unspecified osteoarthritis type  - DX-FOOT-COMPLETE 3+ RIGHT; Future  - REFERRAL TO PODIATRY  - ibuprofen (MOTRIN) 600 MG Tab; Take 1 Tab by mouth every 6 hours as needed for Moderate Pain, Fever or Inflammation.  Dispense: 30 Tab; Refill:  0    -Flat, hard soled shoe to limit flexsion.   -NSAID's (ibuprofen) and tylenol as directed for pain and inflammation.   -RICE Therapy: Rest, Ice, Compression, Elevation  -Limited weight bearing for 2-3 days; and/or weight bearing as tolerated. Declined crutches.     Follow up with PCP. Follow up emergently for severe uncontrolled pain, neurovascular compromise (decreased sensation, motion, or circulation). Follow up if symptoms persist for more than six to eight weeks.    Differential diagnosis, natural history, supportive care, and indications for immediate follow-up discussed.

## 2020-04-28 NOTE — PATIENT INSTRUCTIONS
Bunion (Hallux Valgus)  A bony bump (protrusion) on the inside of the foot, at the base of the first toe, is called a bunion (hallux valgus). A bunion causes the first toe to angle toward the other toes.  SYMPTOMS   · A bony bump on the inside of the foot, causing an outward turning of the first toe. It may also overlap the second toe.  · Thickening of the skin (callus) over the bony bump.  · Fluid buildup under the callus. Fluid may become red, tender, and swollen (inflamed) with constant irritation or pressure.  · Foot pain and stiffness.  CAUSES   Many causes exist, including:  · Inherited from your family (genetics).  · Injury (trauma) forcing the first toe into a position in which it overlaps other toes.  · Bunions are also associated with wearing shoes that have a narrow toe box (pointy shoes).  RISK INCREASES WITH:  · Family history of foot abnormalities, especially bunions.  · Arthritis.  · Narrow shoes, especially high heels.  PREVENTION  · Wear shoes with a wide toe box.  · Avoid shoes with high heels.  · Wear a small pad between the big toe and second toe.  · Maintain proper conditioning:  ¨ Foot and ankle flexibility.  ¨ Muscle strength and endurance.  PROGNOSIS   With proper treatment, bunions can typically be cured. Occasionally, surgery is required.   RELATED COMPLICATIONS   · Infection of the bunion.  · Arthritis of the first toe.  · Risks of surgery, including infection, bleeding, injury to nerves (numb toe), recurrent bunion, overcorrection (toe points inward), arthritis of the big toe, big toe pointing upward, and bone not healing.  TREATMENT   Treatment first consists of stopping the activities that aggravate the pain, taking pain medicines, and icing to reduce inflammation and pain. Wear shoes with a wide toe box. Shoes can be modified by a shoe repair person to relieve pressure on the bunion, especially if you cannot find shoes with a wide enough toe box. You may also place a pad with the  center cut out in your shoe, to reduce pressure on the bunion. Sometimes, an arch support (orthotic) may reduce pressure on the bunion and alleviate the symptoms. Stretching and strengthening exercises for the muscles of the foot may be useful. You may choose to wear a brace or pad at night to hold the big toe away from the second toe. If non-surgical treatments are not successful, surgery may be needed. Surgery involves removing the overgrown tissue and correcting the position of the first toe, by realigning the bones. Bunion surgery is typically performed on an outpatient basis, meaning you can go home the same day as surgery. The surgery may involve cutting the mid portion of the bone of the first toe, or just cutting and repairing (reconstructing) the ligaments and soft tissues around the first toe.   MEDICATION   · If pain medicine is needed, nonsteroidal anti-inflammatory medicines, such as aspirin and ibuprofen, or other minor pain relievers, such as acetaminophen, are often recommended.  · Do not take pain medicine for 7 days before surgery.  · Prescription pain relievers are usually only prescribed after surgery. Use only as directed and only as much as you need.  · Ointments applied to the skin may be helpful.  HEAT AND COLD  · Cold treatment (icing) relieves pain and reduces inflammation. Cold treatment should be applied for 10 to 15 minutes every 2 to 3 hours for inflammation and pain and immediately after any activity that aggravates your symptoms. Use ice packs or an ice massage.  · Heat treatment may be used prior to performing the stretching and strengthening activities prescribed by your caregiver, physical therapist, or . Use a heat pack or a warm soak.  SEEK MEDICAL CARE IF:   · Symptoms get worse or do not improve in 2 weeks, despite treatment.  · After surgery, you develop fever, increasing pain, redness, swelling, drainage of fluids, bleeding, or increasing warmth around the  surgical area.  · New, unexplained symptoms develop. (Drugs used in treatment may produce side effects.)     This information is not intended to replace advice given to you by your health care provider. Make sure you discuss any questions you have with your health care provider.     Document Released: 12/18/2006 Document Revised: 03/11/2013 Document Reviewed: 04/01/2010  [employer] Patient Education ©2016 [employer]    Foot Sprain  Introduction  A foot sprain is an injury to one of the strong bands of tissue (ligaments) that connect and support the many bones in your feet. The ligament can be stretched too much or it can tear. A tear can be either partial or complete. The severity of the sprain depends on how much of the ligament was damaged or torn.  What are the causes?  A foot sprain is usually caused by suddenly twisting or pivoting your foot.  What increases the risk?  This injury is more likely to occur in people who:  · Play a sport, such as basketball or football.  · Exercise or play a sport without warming up.  · Start a new workout or sport.  · Suddenly increase how long or hard they exercise or play a sport.  What are the signs or symptoms?  Symptoms of this condition start soon after an injury and include:  · Pain, especially in the arch of the foot.  · Bruising.  · Swelling.  · Inability to walk or use the foot to support body weight.  How is this diagnosed?  This condition is diagnosed with a medical history and physical exam. You may also have imaging tests, such as:  · X-rays to make sure there are no broken bones (fractures).  · MRI to see if the ligament has torn.  How is this treated?  Treatment varies depending on the severity of your sprain. Mild sprains can be treated with rest, ice, compression, and elevation (RICE). If your ligament is overstretched or partially torn, treatment usually involves keeping your foot in a fixed position (immobilization) for a period of time. To help  you do this, your health care provider will apply a bandage, splint, or walking boot to keep your foot from moving until it heals. You may also be advised to use crutches or a scooter for a few weeks to avoid bearing weight on your foot while it is healing.  If your ligament is fully torn, you may need surgery to reconnect the ligament to the bone. After surgery, a cast or splint will be applied and will need to stay on your foot while it heals.  Your health care provider may also suggest exercises or physical therapy to strengthen your foot.  Follow these instructions at home:  If You Have a Bandage, Splint, or Walking Boot:  · Wear it as directed by your health care provider. Remove it only as directed by your health care provider.  · Loosen the bandage, splint, or walking boot if your toes become numb and tingle, or if they turn cold and blue.  Bathing  · If your health care provider approves bathing and showering, cover the bandage or splint with a watertight plastic bag to protect it from water. Do not let the bandage or splint get wet.  Managing pain, stiffness, and swelling  · If directed, apply ice to the injured area:  ¨ Put ice in a plastic bag.  ¨ Place a towel between your skin and the bag.  ¨ Leave the ice on for 20 minutes, 2-3 times per day.  · Move your toes often to avoid stiffness and to lessen swelling.  · Raise (elevate) the injured area above the level of your heart while you are sitting or lying down.  Driving  · Do not drive or operate heavy machinery while taking pain medicine.  · Ask your health care provider when it is safe to drive if you have a bandage, splint, or walking boot on your foot.  Activity  · Rest as directed by your health care provider.  · Do not use the injured foot to support your body weight until your health care provider says that you can. Use crutches or other supportive devices as directed by your health care provider.  · Ask your health care provider what activities  are safe for you. Gradually increase how much and how far you walk until your health care provider says it is safe to return to full activity.  · Do any exercise or physical therapy as directed by your health care provider.  General instructions  · If a splint was applied, do not put pressure on any part of it until it is fully hardened. This may take several hours.  · Take medicines only as directed by your health care provider. These include over-the-counter medicines and prescription medicines.  · Keep all follow-up visits as directed by your health care provider. This is important.  · When you can walk without pain, wear supportive shoes that have stiff soles. Do not wear flip-flops, and do not walk barefoot.  Contact a health care provider if:  · Your pain is not controlled with medicine.  · Your bruising or swelling gets worse or does not get better with treatment.  · Your splint or walking boot is damaged.  Get help right away if:  · You develop severe numbness or tingling in your foot.  · Your foot turns blue, white, or gray, and it feels cold.  This information is not intended to replace advice given to you by your health care provider. Make sure you discuss any questions you have with your health care provider.  Document Released: 06/09/2003 Document Revised: 05/25/2017 Document Reviewed: 10/21/2015  © 2017 [employer]      Foot Pain  Introduction  Many things can cause foot pain. Some common causes are:  · An injury.  · A sprain.  · Arthritis.  · Blisters.  · Bunions.  Follow these instructions at home:  Pay attention to any changes in your symptoms. Take these actions to help with your discomfort:  · If directed, put ice on the affected area:  ¨ Put ice in a plastic bag.  ¨ Place a towel between your skin and the bag.  ¨ Leave the ice on for 15-20 minutes, 3?4 times a day for 2 days.  · Take over-the-counter and prescription medicines only as told by your health care provider.  · Wear comfortable,  supportive shoes that fit you well. Do not wear high heels.  · Do not stand or walk for long periods of time.  · Do not lift a lot of weight. This can put added pressure on your feet.  · Do stretches to relieve foot pain and stiffness as told by your health care provider.  · Rub your foot gently.  · Keep your feet clean and dry.  Contact a health care provider if:  · Your pain does not get better after a few days of self-care.  · Your pain gets worse.  · You cannot stand on your foot.  Get help right away if:  · Your foot is numb or tingling.  · Your foot or toes are swollen.  · Your foot or toes turn white or blue.  · You have warmth and redness along your foot.  This information is not intended to replace advice given to you by your health care provider. Make sure you discuss any questions you have with your health care provider.  Document Released: 01/13/2017 Document Revised: 05/25/2017 Document Reviewed: 01/13/2016  © 2017 [employer]

## 2020-11-10 ENCOUNTER — HOSPITAL ENCOUNTER (OUTPATIENT)
Dept: RADIOLOGY | Facility: MEDICAL CENTER | Age: 55
End: 2020-11-10
Attending: OBSTETRICS & GYNECOLOGY
Payer: COMMERCIAL

## 2020-11-10 DIAGNOSIS — Z12.31 ENCOUNTER FOR SCREENING MAMMOGRAM FOR BREAST CANCER: ICD-10-CM

## 2020-11-10 PROCEDURE — 77067 SCR MAMMO BI INCL CAD: CPT

## 2022-04-04 ENCOUNTER — HOSPITAL ENCOUNTER (OUTPATIENT)
Dept: RADIOLOGY | Facility: MEDICAL CENTER | Age: 57
End: 2022-04-04
Attending: FAMILY MEDICINE
Payer: COMMERCIAL

## 2022-04-04 DIAGNOSIS — Z12.31 VISIT FOR SCREENING MAMMOGRAM: ICD-10-CM

## 2022-04-04 PROCEDURE — 77063 BREAST TOMOSYNTHESIS BI: CPT

## 2022-04-18 ENCOUNTER — HOSPITAL ENCOUNTER (OUTPATIENT)
Dept: RADIOLOGY | Facility: MEDICAL CENTER | Age: 57
End: 2022-04-18
Attending: OBSTETRICS & GYNECOLOGY
Payer: COMMERCIAL

## 2022-04-18 DIAGNOSIS — R92.8 ABNORMAL MAMMOGRAM: ICD-10-CM

## 2022-04-18 DIAGNOSIS — R92.8 ABNORMAL FINDINGS ON DIAGNOSTIC IMAGING OF BREAST: ICD-10-CM

## 2022-04-18 LAB — PATHOLOGY CONSULT NOTE: NORMAL

## 2022-04-18 PROCEDURE — 19083 BX BREAST 1ST LESION US IMAG: CPT | Mod: LT

## 2022-04-18 PROCEDURE — 88305 TISSUE EXAM BY PATHOLOGIST: CPT

## 2022-04-18 PROCEDURE — 76642 ULTRASOUND BREAST LIMITED: CPT | Mod: LT

## 2022-04-19 ENCOUNTER — TELEPHONE (OUTPATIENT)
Dept: RADIOLOGY | Facility: MEDICAL CENTER | Age: 57
End: 2022-04-19
Payer: COMMERCIAL

## 2022-04-20 ENCOUNTER — TELEPHONE (OUTPATIENT)
Dept: RADIOLOGY | Facility: MEDICAL CENTER | Age: 57
End: 2022-04-20
Payer: COMMERCIAL

## 2022-04-20 NOTE — TELEPHONE ENCOUNTER
Left msg for Dr.Steven Mohamud's office : make sure office received breast bx results & to have provider place a referral to a breast surgeon.

## 2022-10-26 ENCOUNTER — HOSPITAL ENCOUNTER (OUTPATIENT)
Dept: RADIOLOGY | Facility: MEDICAL CENTER | Age: 57
End: 2022-10-26
Attending: FAMILY MEDICINE
Payer: COMMERCIAL

## 2022-10-26 DIAGNOSIS — R92.8 ABNORMAL MAMMOGRAM: ICD-10-CM

## 2022-10-26 PROCEDURE — 76642 ULTRASOUND BREAST LIMITED: CPT | Mod: LT

## 2022-10-26 PROCEDURE — G0279 TOMOSYNTHESIS, MAMMO: HCPCS

## 2023-03-30 ENCOUNTER — HOSPITAL ENCOUNTER (OUTPATIENT)
Dept: RADIOLOGY | Facility: MEDICAL CENTER | Age: 58
End: 2023-03-30
Attending: FAMILY MEDICINE
Payer: COMMERCIAL

## 2023-03-30 DIAGNOSIS — D24.2 PAPILLOMA OF LEFT BREAST: ICD-10-CM

## 2023-03-30 PROCEDURE — G0279 TOMOSYNTHESIS, MAMMO: HCPCS

## 2023-03-30 PROCEDURE — 76642 ULTRASOUND BREAST LIMITED: CPT | Mod: LT

## 2024-01-24 ENCOUNTER — APPOINTMENT (OUTPATIENT)
Dept: NEPHROLOGY | Facility: MEDICAL CENTER | Age: 59
End: 2024-01-24
Payer: COMMERCIAL

## 2024-01-31 ENCOUNTER — OFFICE VISIT (OUTPATIENT)
Dept: NEPHROLOGY | Facility: MEDICAL CENTER | Age: 59
End: 2024-01-31
Payer: COMMERCIAL

## 2024-01-31 VITALS
WEIGHT: 148 LBS | SYSTOLIC BLOOD PRESSURE: 110 MMHG | DIASTOLIC BLOOD PRESSURE: 70 MMHG | OXYGEN SATURATION: 98 % | TEMPERATURE: 97.2 F | BODY MASS INDEX: 25.27 KG/M2 | HEART RATE: 68 BPM | HEIGHT: 64 IN

## 2024-01-31 DIAGNOSIS — R79.89 ELEVATED SERUM CREATININE: ICD-10-CM

## 2024-01-31 PROCEDURE — 3074F SYST BP LT 130 MM HG: CPT | Performed by: INTERNAL MEDICINE

## 2024-01-31 PROCEDURE — 3078F DIAST BP <80 MM HG: CPT | Performed by: INTERNAL MEDICINE

## 2024-01-31 PROCEDURE — 99203 OFFICE O/P NEW LOW 30 MIN: CPT | Performed by: INTERNAL MEDICINE

## 2024-01-31 ASSESSMENT — ENCOUNTER SYMPTOMS
SHORTNESS OF BREATH: 0
FEVER: 0
VOMITING: 0
COUGH: 0
NAUSEA: 0
CHILLS: 0

## 2024-01-31 ASSESSMENT — FIBROSIS 4 INDEX: FIB4 SCORE: 1.09

## 2024-01-31 NOTE — PROGRESS NOTES
"Laureen Portillo is a 58 y.o. female who presents with elevated creatinine            The patient is a pleasant 58-year-old lady without any significant past medical history, found on routine labs and August 2023 to have elevated serum creatinine.  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        Review of Systems   Constitutional:  Negative for chills, fever and malaise/fatigue.   Respiratory:  Negative for cough and shortness of breath.    Cardiovascular:  Negative for chest pain and leg swelling.   Gastrointestinal:  Negative for nausea and vomiting.   Genitourinary:  Negative for dysuria, frequency and urgency.              Objective     /70 (BP Location: Right arm, Patient Position: Sitting, BP Cuff Size: Adult)   Pulse 68   Temp 36.2 °C (97.2 °F) (Temporal)   Ht 1.626 m (5' 4\")   Wt 67.1 kg (148 lb)   SpO2 98%   BMI 25.40 kg/m²      Physical Exam  Vitals and nursing note reviewed.   Constitutional:       General: She is not in acute distress.     Appearance: Normal appearance. She is well-developed. She is not diaphoretic.   HENT:      Head: Normocephalic and atraumatic.      Right Ear: External ear normal.      Left Ear: External ear normal.      Nose: Nose normal.   Eyes:      General: No scleral icterus.        Right eye: No discharge.         Left eye: No discharge.      Conjunctiva/sclera: Conjunctivae normal.   Cardiovascular:      Rate and Rhythm: Normal rate and regular rhythm.      Heart sounds: No murmur heard.  Pulmonary:      Effort: Pulmonary effort is normal. No respiratory distress.      Breath sounds: Normal breath sounds.   Musculoskeletal:         General: No tenderness.      Right lower leg: No edema.      Left lower leg: No edema.   Skin:     General: Skin is warm and dry.      Findings: No erythema.   Neurological:      General: No focal deficit present.      Mental Status: She is alert and " oriented to person, place, and time.      Cranial Nerves: No cranial nerve deficit.   Psychiatric:         Mood and Affect: Mood normal.         Behavior: Behavior normal.       Past Medical History:   Diagnosis Date    Abnormal mammogram      Social History     Socioeconomic History    Marital status:      Spouse name: Not on file    Number of children: Not on file    Years of education: Not on file    Highest education level: Not on file   Occupational History    Not on file   Tobacco Use    Smoking status: Never    Smokeless tobacco: Never   Vaping Use    Vaping Use: Never used   Substance and Sexual Activity    Alcohol use: Yes     Alcohol/week: 0.0 oz     Comment: Rare    Drug use: Never    Sexual activity: Not on file   Other Topics Concern    Not on file   Social History Narrative    Not on file     Social Determinants of Health     Financial Resource Strain: Not on file   Food Insecurity: Not on file   Transportation Needs: Not on file   Physical Activity: Not on file   Stress: Not on file   Social Connections: Not on file   Intimate Partner Violence: Not on file   Housing Stability: Not on file     Family History   Problem Relation Age of Onset    Lung Disease Mother     Heart Disease Father     Cancer Maternal Grandmother         Bladder    Diabetes Brother      Recent Labs     03/10/23  0752 06/29/23  0824 08/28/23  0830   ALBUMIN 4.1 4.7 4.6   HDL 49.0  --   --    TRIGLYCERIDE 54  --   --    SODIUM 140 141 139   POTASSIUM 4.2 3.8 4.1   CHLORIDE 102 106 103   CO2 31 30 31   BUN 21* 21* 21*   CREATININE 1.1* 1.05 1.15*                             Assessment & Plan        1. Elevated serum creatinine  Etiology of which is not very clear, most likely prerenal component  Patient has no uremic symptoms  No acute need for dialysis  Plan to recheck labs after hydration  Check urine protein to creatinine ratio  Renal dose of medication  Avoid nephrotoxins  Patient was advised to call us if symptoms  worsen

## 2024-02-16 ENCOUNTER — PATIENT MESSAGE (OUTPATIENT)
Dept: HEALTH INFORMATION MANAGEMENT | Facility: OTHER | Age: 59
End: 2024-02-16

## 2024-03-25 NOTE — PROCEDURE: LIQUID NITROGEN
FAMILY HISTORY:  Family history of leukemia, father  age 56 from leukemia    Sibling  Still living? Unknown  Family history of rheumatoid arthritis, Age at diagnosis: Age Unknown    
Detail Level: Detailed
Consent: The patient's consent was obtained including but not limited to risks of crusting, scabbing, blistering, scarring, darker or lighter pigmentary change, recurrence, incomplete removal and infection.
Number Of Freeze-Thaw Cycles: 1 freeze-thaw cycle
Render Post-Care Instructions In Note?: no
Duration Of Freeze Thaw-Cycle (Seconds): 15
Post-Care Instructions: I reviewed with the patient in detail post-care instructions. Patient is to wear sunprotection, and avoid picking at any of the treated lesions. Pt may apply Vaseline to crusted or scabbing areas.

## 2024-05-06 ENCOUNTER — HOSPITAL ENCOUNTER (OUTPATIENT)
Dept: RADIOLOGY | Facility: MEDICAL CENTER | Age: 59
End: 2024-05-06
Attending: FAMILY MEDICINE
Payer: COMMERCIAL

## 2024-05-06 DIAGNOSIS — Z12.31 VISIT FOR SCREENING MAMMOGRAM: ICD-10-CM
